# Patient Record
Sex: FEMALE | Race: WHITE | Employment: OTHER | URBAN - METROPOLITAN AREA
[De-identification: names, ages, dates, MRNs, and addresses within clinical notes are randomized per-mention and may not be internally consistent; named-entity substitution may affect disease eponyms.]

---

## 2023-06-07 ENCOUNTER — HOSPITAL ENCOUNTER (OUTPATIENT)
Age: 73
Setting detail: THERAPIES SERIES
End: 2023-06-07
Payer: MEDICARE

## 2023-06-07 PROCEDURE — 97140 MANUAL THERAPY 1/> REGIONS: CPT

## 2023-06-07 PROCEDURE — 97110 THERAPEUTIC EXERCISES: CPT

## 2023-06-19 ENCOUNTER — HOSPITAL ENCOUNTER (OUTPATIENT)
Age: 73
Setting detail: THERAPIES SERIES
Discharge: HOME | End: 2023-06-19
Payer: MEDICARE

## 2023-06-19 PROCEDURE — 97110 THERAPEUTIC EXERCISES: CPT

## 2023-06-19 PROCEDURE — 97140 MANUAL THERAPY 1/> REGIONS: CPT

## 2023-06-19 NOTE — FLOWSHEET NOTE
[] [facility]) - [facility] & Therapy  [address] [place], [address]    Physical Therapy Daily Treatment Note      Date:  2023  Patient Name:  Morena Brady    :  1950  MRN: [id number]  Physician:      Insurance: medicare, MMO suppliment  Diagnosis: s/p TSA   Onset Date: 3/13/23   Next Dr. Abdi Jackson: none scheduled  Visit# / total visits:  Medicare requires PN at visit 10  Cancels/No Shows: 0/0  Unable to verify amount of PT sessions previously- added KX modifier for month of  to ensure not missed. Subjective:  Patient reports  not feeling too bad today, trying to use UE more and more. Did a lot of housework over the weekend. Still not sleeping well at night.   Pain:  [x] Yes  [] No Location: right shoulder  Pain Rating: (0-10 scale) 3/10  Pain altered Tx:  [] No  [] Yes  Action:  Comments:    Objective:  Modalities: ice  Precautions:  Exercises:  Exercise Reps/ Time Weight/ Level Completed  Today Comments   pulleys x3'  x    codmans x10 2# x Made LB pain worsen- so completed fewer reps   Table/ door scrubs X20 each  x    Flexion stretch at SM 15\" x4  x    Finger ladder x10  x Able to reach higher today 6/12   Bicep curls x15 2# x    Shoulder add x15 yellow x    Rows/ ext/ER/IR x12 yellow x    Cane abd/ ER X15 each      Gentle corner stretch 15\" x3  x    Standing AROM abduction x5  x Added 6/   Tricep pull downs  x10 Blue t-band x Added /   Diagonal flex/ext  x10 each AROM x Added , fatigued quickly- watch form   Supine cane flex x15 2# x    Supine ABC's 1 round 1# x 6/15 added weight today          Wall push ups x10  x Added 6/          Right shoulder PROM/ mobs, scap mobs x10  x           Right shouder MHP       ice x10  x Requested ice rather than heat today                                             Other:    Specific Instructions for next treatment:continue working on progressing strength/ flexibility right UE      Assessment: [x]

## 2023-06-22 ENCOUNTER — HOSPITAL ENCOUNTER (OUTPATIENT)
Age: 73
Setting detail: THERAPIES SERIES
Discharge: HOME | End: 2023-06-22
Payer: MEDICARE

## 2023-06-22 PROCEDURE — 97140 MANUAL THERAPY 1/> REGIONS: CPT

## 2023-06-22 PROCEDURE — 97110 THERAPEUTIC EXERCISES: CPT

## 2023-06-22 NOTE — FLOWSHEET NOTE
Progressing toward goals. [] No change. [] Other:    [x] Patient would continue to benefit from skilled physical therapy services in order to: improve strength and ROM to allow full return to normal function. Patient continues to be doing more functionally at home with less intense pain. Reports she has been using right UE more with home chores/ cooking, and minimizing pain meds- although she isn't able to use % yet. Improved flexibility noted with ex's. STG/LTG  STG's: 1- decrease pain right UE to 0-2/10  2- improve ROM to WFL's  3- increase strength to 4+/5  4- improve SPADI score to 110/130    LTG's:  1- Patient to return to normal function including sleeping, reading, overhead and behind back reaching, dressing, bathing, lifting, and outdoor chores with minimal pain/ difficulty    Pt. Education:  [x] Yes  [] No  [x] Reviewed Prior HEP/Ed  Method of Education: [x] Verbal  [x] Demo  [] Written  Comprehension of Education:  [] Verbalizes understanding. [x] Demonstrates understanding. [x] Needs review. [] Demonstrates/verbalizes HEP/Ed previously given. Plan: [x] Continue per plan of care.    [] Other:      Treatment Charges: Mins Units   [x]  Modalities 10 0   [x]  Ther Exercise 35 2   [x]  Manual Therapy 10 1   []  Ther Activities     []  Aquatics     []  Neuromuscular     [] Vasocompression     [] Gait Training     [] Dry needling        [] 1 or 2 muscles        [] 3 or more muscles     []  Other     Total Treatment time 55 3     Time In:200            Time Out: 300    Electronically signed by:  Farhat Quinones PTA

## 2023-06-26 ENCOUNTER — HOSPITAL ENCOUNTER (OUTPATIENT)
Age: 73
Setting detail: THERAPIES SERIES
Discharge: HOME | End: 2023-06-26
Payer: MEDICARE

## 2023-06-29 ENCOUNTER — HOSPITAL ENCOUNTER (OUTPATIENT)
Age: 73
Setting detail: THERAPIES SERIES
Discharge: HOME | End: 2023-06-29
Payer: MEDICARE

## 2023-06-29 PROCEDURE — 97110 THERAPEUTIC EXERCISES: CPT

## 2023-06-29 PROCEDURE — 97140 MANUAL THERAPY 1/> REGIONS: CPT

## 2023-07-03 ENCOUNTER — APPOINTMENT (OUTPATIENT)
Age: 73
End: 2023-07-03
Payer: MEDICARE

## 2023-07-06 ENCOUNTER — HOSPITAL ENCOUNTER (OUTPATIENT)
Age: 73
Setting detail: THERAPIES SERIES
Discharge: HOME | End: 2023-07-06
Payer: MEDICARE

## 2023-07-06 PROCEDURE — 97140 MANUAL THERAPY 1/> REGIONS: CPT

## 2023-07-06 PROCEDURE — 97110 THERAPEUTIC EXERCISES: CPT

## 2023-07-06 NOTE — FLOWSHEET NOTE
[x] [facility]) - [facility] & Therapy  [address] [place], [address]     Physical Therapy Daily Treatment Note      Date:  2023  Patient Name:  Sofía Mckinley    :  1950  MRN: [id number]  Physician:      Insurance: medicare, MMO suppliment  Diagnosis: s/p TSA   Onset Date: 3/13/23   Next  [facility]: none scheduled  Visit# / total visits:  Medicare requires PN at visit 10  Cancels/No Shows: 2/0  Unable to verify amount of PT sessions previously- added KX modifier for month of  to ensure not missed. Subjective: Pt reports right shoulder is feeling better. She reports very little pain in shoulder. She notes her shoulder is more stiff than painful. She does report she had an MRI yesterday of her back and that revealed a compression fracture of L4.   Pain:  [x] Yes  [] No Location: right shoulder  Pain Rating: (0-10 scale) 1/10  Pain altered Tx:  [x] No  [] Yes  Action:  Comments:   Objective:  Modalities: ice  Precautions:  Exercises:  Exercise Reps/ Time Weight/ Level Completed  Today Comments   pulleys x3'  x    codmans x10 2# Hold today    door scrubs X20   x    Flexion stretch at SM 15\" x4  Hold today    Finger ladder x10  x Able to reach higher today 6/12   Bicep curls x15 2# Hold today    Shoulder add --- yellow Hold today    Rows/ ext/ER/IR -- yellow Hold today    Cane abd/ ER X15 each  -    Gentle corner stretch 15\" x3  x    Standing AROM flex and abduction X5-10  x Added 6/   Tricep pull downs  -- Blue t-band Hold today Added /   Diagonal flex/ext  x15 each AROM x Added , fatigued quickly- watch form   Supine cane flex x15 2# x    Supine ABC's 1 round 1# x 6/15 added weight today   Sidelying ER 20 x      Wall push ups x10  x Added 6/12          Right shoulder PROM/ mobs, scap mobs x10  x           Right shouder MHP       ice x10  x Requested ice rather than heat today                               ROM right shoulder: flex, abd, ER, IR

## 2024-01-25 ENCOUNTER — HOSPITAL ENCOUNTER (OUTPATIENT)
Age: 74
Setting detail: THERAPIES SERIES
Discharge: HOME | End: 2024-01-25
Payer: MEDICARE

## 2024-01-25 PROCEDURE — 97110 THERAPEUTIC EXERCISES: CPT

## 2024-01-25 PROCEDURE — 97161 PT EVAL LOW COMPLEX 20 MIN: CPT

## 2024-01-25 NOTE — CONSULTS
[] [facility]  Outpatient Rehabilitation &  Therapy  [address]  [phone]  F: [phone] [] [facility]  Outpatient Rehabilitation &  Therapy  [address]  P: [phone]  F: [phone] [] [facility]  Outpatient Rehabilitation &  Therapy  [address]  P: [phone]  F: [phone] [] [facility]  Outpatient Rehabilitation &  Therapy  [address]  P: [phone]  F: [phone] [] [facility]  Outpatient Rehabilitation &  Therapy  [address]   P: [phone]  F: [phone]  [] [facility]  Outpatient Rehabilitation &  Therapy  [address]   P: [phone]  F: [phone] [x] [facility]  Outpatient Rehabilitation &  Therapy  [address]  P: [phone]  F: [phone] [] [facility]  Outpatient Rehabilitation &  Therapy  [address]  P: [phone]  F: [phone] [] [facility]  Outpatient Rehabilitation &  Therapy  [address]  P: [phone]  F: [phone]  [] [facility] Outpatient Rehabilitation &  Therapy  [address]  P: [phone]  F: [phone]     Physical Therapy General Evaluation    Date:  2024  Patient: Katie Mathis  : 1950  MRN: [id number]  Physician: Rad Riojas      Insurance: Medicare; Medical [place];  BMN, No Hard Max; No AUTH needed  Medical Diagnosis: S76.311D (ICD-10-CM) - Strain of muscle, fascia and tendon of the posterior muscle group at thigh level, right thigh, subsequent encounter     Rehab Codes: R26.89, M79.651, M79.661, M62.551, R26.81  Onset Date: 22                                  Next 's appt: 3/8/22    Subjective: 22 at [facility], slipped on water on floor.  Fell onto right side with both legs going out to the

## 2024-01-31 ENCOUNTER — HOSPITAL ENCOUNTER (OUTPATIENT)
Age: 74
Setting detail: THERAPIES SERIES
Discharge: HOME | End: 2024-01-31
Payer: MEDICARE

## 2024-01-31 PROCEDURE — 97110 THERAPEUTIC EXERCISES: CPT

## 2024-01-31 NOTE — FLOWSHEET NOTE
[] [facility]  Outpatient Rehabilitation & Therapy  [address] [place], [place] [address]    Physical Therapy Daily Treatment Note      Date:  2024  Patient Name:  Katie Mathis    :  1950  MRN: [id number]  Physician:   Rad Riojas      Insurance:  Medicare; Medical [place];  BMN, No Hard Max; No AUTH needed   Diagnosis: S76.311D (ICD-10-CM) - Strain of muscle, fascia and tendon of the posterior muscle group at thigh level, right thigh, subsequent encounter                  Rehab Codes: R26.89, M79.651, M79.661, M62.551, R26.81   Onset Date:  22    Next  Appt:  3/8/22     Visit# / total visits:   Cancels/No Shows: 0/0    Subjective:    Pain:  [x] Yes  [] No Location: meghan LB  Pain Rating: (0-10 scale) 2/10  Pain altered Tx:  [x] No  [] Yes  Action:  Comments: Pain in right LE with walking / sitting. Finds she doesn't pick her feet up as well as she should. Right LE heaviness/ weakness, but currently pain free. States she was complaint with HEP since last session, but had difficulty with prone activities (in LB).     Objective:  walks into clinic without device  Modalities:   Precautions:  Right Ham partial tear;  Limited balance   Exercises: exercises completed today in bold  Exercise Reps/ Time Weight/ Level Comments   Prone 4'    prone on pillow   CARMEN 3'       4 way SLR Bilateral 10 each       Bilateral Piriformis stretch  15\" x4     24 new   LTR  x10     new   Bridge  x10     new   Pelvic tilts  x10     new   Ant hip stretch  1' meghan     new                                           STANDING         Calf stretch  15\" x4   meghan    new   Squats         Heel/toe raises         Ham stretch at steps?  15\" x4   meghan    new                                           BALANCE         Feet together         On Foam         Biased Stance         Eyes Closed         Single Leg Stance                                       Side Step

## 2024-02-07 ENCOUNTER — APPOINTMENT (OUTPATIENT)
Age: 74
End: 2024-02-07
Payer: MEDICARE

## 2024-02-14 ENCOUNTER — HOSPITAL ENCOUNTER (OUTPATIENT)
Age: 74
Setting detail: THERAPIES SERIES
Discharge: HOME | End: 2024-02-14

## 2024-02-14 NOTE — FLOWSHEET NOTE
[] [facility]  Outpatient Rehabilitation & Therapy  [address]   [place], [place] [address]       Physical Therapy Cancel/No Show note    Date: 2024  Patient: Katie Mathis  : 1950  MRN: [id number]    Visit Count:   Cancels/No Shows to date:     For today's appointment patient:    [x]  Cancelled    [] Rescheduled appointment    [] No-show     Reason given by patient:    [x]  Patient ill    []  Conflicting appointment    [] No transportation      [] Conflict with work    [] No reason given    [] Weather related    [] COVID-19    [] Other:      Comments:        [x] Next appointment was confirmed    Electronically signed by: Christy Bacon PTA

## 2024-02-21 ENCOUNTER — APPOINTMENT (OUTPATIENT)
Age: 74
End: 2024-02-21
Payer: MEDICARE

## 2024-02-26 ENCOUNTER — HOSPITAL ENCOUNTER (OUTPATIENT)
Age: 74
Setting detail: THERAPIES SERIES
Discharge: HOME | End: 2024-02-26
Payer: MEDICARE

## 2024-02-26 NOTE — FLOWSHEET NOTE
[] [facility]  Outpatient Rehabilitation & Therapy  [address]   [place], [place] [address]       Physical Therapy Cancel/No Show note    Date: 2024  Patient: Katie Mathis  : 1950  MRN: [id number]    Visit Count:   Cancels/No Shows to date:     For today's appointment patient:    [x]  Cancelled    [] Rescheduled appointment    [] No-show     Reason given by patient:    [x]  Patient ill    []  Conflicting appointment    [] No transportation      [] Conflict with work    [] No reason given    [] Weather related    [] COVID-19    [] Other:      Comments:        [x] Next appointment was confirmed    Electronically signed by: Christy Bacon PTA

## 2024-02-29 ENCOUNTER — HOSPITAL ENCOUNTER (OUTPATIENT)
Age: 74
Setting detail: THERAPIES SERIES
Discharge: HOME | End: 2024-02-29
Payer: MEDICARE

## 2024-02-29 PROCEDURE — 97110 THERAPEUTIC EXERCISES: CPT

## 2024-02-29 NOTE — FLOWSHEET NOTE
Piriformis Stretch with Foot on Ground  - 2 x daily - 7 x weekly - 1 sets - 4 reps - 15 hold  - Supine Lower Trunk Rotation  - 2 x daily - 7 x weekly - 1 sets - 10 reps  - Supine Bridge  - 2 x daily - 7 x weekly - 1 sets - 10 reps  - Supine Posterior Pelvic Tilt  - 2 x daily - 7 x weekly - 3 sets - 10 reps  - Modified Bryan Stretch  - 1 x daily - 7 x weekly - 1 sets - 10 reps  - Gastroc Stretch on Wall  - 2 x daily - 7 x weekly - 1 sets - 4 reps - 15 hold  - Mini Squat with Counter Support  - 2 x daily - 7 x weekly - 1 sets - 10 reps  - Heel Toe Raises with Counter Support  - 2 x daily - 7 x weekly - 1 sets - 10 reps  - Standing Hamstring Stretch with Step  - 2 x daily - 7 x weekly - 1 sets - 4 reps - 15 hold  - Tandem Stance  - 2 x daily - 7 x weekly - 1 sets - 4 reps - 15 hold  - Single Leg Stance  - 2 x daily - 7 x weekly - 1 sets - 4 reps - 15 hold    Access Code: [email or web address]  URL: [email or web address]  Date: 01/25/2024  Prepared by: Reynaldo Ambriz  Plan: [x] Continue per plan of care.   [] Other:      Treatment Charges: Mins Units   []  Modalities     [x]  Ther Exercise 43 3   []  Manual Therapy     []  Ther Activities     []  Aquatics     []  Neuromuscular     [] Vasocompression     [] Gait Training     [] Dry needling        [] 1 or 2 muscles        [] 3 or more muscles     []  Other     Total Treatment time 43 3     Time In: 1:30 pm            Time Out: 2:13 pm    Electronically signed by:  Christy Bacon PTA

## 2024-03-04 ENCOUNTER — HOSPITAL ENCOUNTER (OUTPATIENT)
Age: 74
Setting detail: THERAPIES SERIES
Discharge: HOME | End: 2024-03-04
Payer: MEDICARE

## 2024-03-04 PROCEDURE — 97110 THERAPEUTIC EXERCISES: CPT

## 2024-03-04 NOTE — FLOWSHEET NOTE
STG/LTG  STG: (to be met in 10 treatments)  ? Pain: Low back and right leg to 5/10 at worst to facilitate walking  ? ROM: Of trunk to at least 75% of normal to facilitate walking and climbing steps  ? Strength: Bilateral hips to 4/10 to facilitate walking and cooking  ? Function: Pt to walk to grocery shop without increased pain  Patient to be independent with home exercise program as demonstrated by performance with correct form without cues.  Demonstrate Knowledge of fall prevention 1/31/24 MET fall prevention handout given  LTG: (to be met in 24 treatments)  Improve Tinetti score to at least 24/28 to prevent falls  Improve TUG test time to 10 seconds to facilitate walking and house work  Improve LEFS  score to 25% impaired at worst to facilitate walking and nuha  Decrease low back and right leg pain to 3/10 at worst to facilitate walking and baking     Pt. Education:  [x] Yes  [] No  [x] Reviewed Prior HEP/Ed  Method of Education: [x] Verbal  [x] Demo  [] Written  Comprehension of Education:  [x] Verbalizes understanding.  [] Demonstrates understanding.  [x] Needs review.  [] Demonstrates/verbalizes HEP/Ed previously given.   3/4/24 instructed on newly added ex's- updated Med bridge to include fwd/ lateral step ex's  2/29/24: discussed plan to add further strength acts next session if she feels ok  1/31/24 reviewed current HEP and updated with newly added exercises.   Access Code: [email or web address]  URL: [email or web address]  Date: 03/04/2024  Prepared by: Christy Bacon    Exercises  - Lying Prone  - 2 x daily - 7 x weekly - 1 sets - 1 reps - 5 minutes hold  - Static Prone on Elbows  - 2 x daily - 7 x weekly - 1 sets - 1 reps - 3 minutes hold  - Prone Hip Extension  - 2 x daily - 7 x weekly - 1 sets - 10 reps  - Sidelying Hip Abduction  - 2 x daily - 7 x weekly - 1 sets - 10 reps  - Sidelying Hip Adduction  - 2 x daily - 7 x weekly - 1 sets - 10 reps  - Straight Leg Raise  - 2 x daily - 7 x weekly - 1 sets

## 2024-03-07 ENCOUNTER — HOSPITAL ENCOUNTER (OUTPATIENT)
Age: 74
Setting detail: THERAPIES SERIES
Discharge: HOME | End: 2024-03-07
Payer: MEDICARE

## 2024-03-07 PROCEDURE — 97110 THERAPEUTIC EXERCISES: CPT

## 2024-03-07 NOTE — FLOWSHEET NOTE
Feet together  15\" x 2    Added  3/7/24   On Foam         Biased Stance  15\" x 2    Added 3/7/24   Eyes Closed  15\" x 2    Added 3/7/24   Single Leg Stance                                       Side Step         Heel to toe walk         Retro walk                                                                               Ultrasound??                   Dry Needling??  discussed                                                                                                                                                                                                                                                                           Other: issued fall prevention handout 1/31/24- will use heat at home today.  Had previously discussed dry needling with patient (we do not do here, but can get her in contact with a therapist who does)- she is not interested at this time due to wanting to focus on strength and flexibility. Possibly add lateral step ups next session.    Specific Instructions for next treatment: monitor response to exercises and advance as appropriate. If patient feeling up to it, add further standing strength/ balance acts.      Assessment: [x] Progressing toward goals. Pt reports she is finally feeling better from being ill recently.  She notes she has been walking better with more speed over past several days.  She tolerated balance activities that were added today without increased pain.  She did demonstrate limited balance with PT today.  She should benefit from more PT to continue to work on LE strength and balance to improve her safe mobility.    [] No change .     [] Other:    [x] Patient would continue to benefit from skilled physical therapy services due to injury to right hamstring and recent Lumbar kyphoplasty.  She notes pain and soreness in low back, pelvis area, and right leg.  She also has limited flexibility and strength of her trunk and LE's.  Katie also demonstrated  limited

## 2024-03-11 ENCOUNTER — HOSPITAL ENCOUNTER (OUTPATIENT)
Age: 74
Setting detail: THERAPIES SERIES
Discharge: HOME | End: 2024-03-11
Payer: MEDICARE

## 2024-03-11 PROCEDURE — 97110 THERAPEUTIC EXERCISES: CPT

## 2024-03-11 PROCEDURE — 97112 NEUROMUSCULAR REEDUCATION: CPT

## 2024-03-11 NOTE — FLOWSHEET NOTE
[x] [facility]  Outpatient Rehabilitation & Therapy  [address] [place], [place] [address]    Physical Therapy Daily Treatment Note      Date:  3/11/2024  Patient Name:  Katie Mathis    :  1950  MRN: [id number]  Physician:   Rad Riojas      Insurance:  Medicare; Medical [place];  BMN, No Hard Max; No AUTH needed   Diagnosis: S76.311D (ICD-10-CM) - Strain of muscle, fascia and tendon of the posterior muscle group at thigh level, right thigh, subsequent encounter                  Rehab Codes: R26.89, M79.651, M79.661, M62.551, R26.81   Onset Date:  22    Next  Appt:  4/3/24 (ortho)     Visit# / total visits:   Cancels/No Shows: 2/0    Subjective:    Pain:  [x] Yes  [] No Location: jen LB / left shoulder Pain Rating: (0-10 scale) 2/10  Pain altered Tx:  [x] No  [] Yes  Action:  Comments: \"my body is extremely tired today\". States she thinks she may need to get blood work done, she has been very tired and thirsty  Objective:  walks into clinic without device, occasionally dragging feet- dillon when tired  Modalities:   Precautions:  Right Ham partial tear;  Limited balance   Exercises: exercises completed today in bold  Exercise Reps/ Time Weight/ Level Comments   Prone 4'    no pillow   CARMEN 3'       4 way SLR Bilateral x15 each       Bilateral Piriformis stretch  15\" x4     24 new   LTR  x10     new   Bridge  x10     new   Pelvic tilts  x10     new   Ant hip stretch  1' jen     new                                           STANDING         Calf stretch  15\" x4   jen    new   Squats  x10    added 3/4   Heel/toe raises  x20    added 3/4   Ham stretch at steps  15\" x4   jen    new    fwd step ups  6\" x10  jen  added 3/4    lateral step ups  6\" x 10  JEN Added  3/7                       BALANCE         Feet together  15\" x 2    Added  3/7/24   On Foam         Biased Stance  15\" x 2    Added 3/7/24   Eyes Closed  15\" x 2  on foam Minerva

## 2024-03-14 ENCOUNTER — HOSPITAL ENCOUNTER (OUTPATIENT)
Age: 74
Setting detail: THERAPIES SERIES
Discharge: HOME | End: 2024-03-14
Payer: MEDICARE

## 2024-03-14 NOTE — FLOWSHEET NOTE
[] [facility]  Outpatient Rehabilitation & Therapy  [address]   [place], [place] [address]       Physical Therapy Cancel/No Show note    Date: 3/14/2024  Patient: Katie Mathis  : 1950  MRN: [id number]    Visit Count:   Cancels/No Shows to date: 3/0    For today's appointment patient:    [x]  Cancelled    [] Rescheduled appointment    [] No-show     Reason given by patient:    [x]  Patient ill    []  Conflicting appointment    [] No transportation      [] Conflict with work    [] No reason given    [] Weather related    [] COVID-19    [] Other:      Comments:        [x] Next appointment was confirmed    Electronically signed by: Reynaldo Ambriz PT

## 2024-03-18 ENCOUNTER — HOSPITAL ENCOUNTER (OUTPATIENT)
Age: 74
Setting detail: THERAPIES SERIES
Discharge: HOME | End: 2024-03-18
Payer: MEDICARE

## 2024-03-18 PROCEDURE — 97110 THERAPEUTIC EXERCISES: CPT

## 2024-03-18 NOTE — FLOWSHEET NOTE
[x] [facility]  Outpatient Rehabilitation & Therapy  [address] [place], [place] [address]    Physical Therapy Daily Treatment Note      Date:  3/18/2024  Patient Name:  Katie Mathis    :  1950  MRN: [id number]  Physician:   Rad Riojas      Insurance:  Medicare; Medical [place];  BMN, No Hard Max; No AUTH needed   Diagnosis: S76.311D (ICD-10-CM) - Strain of muscle, fascia and tendon of the posterior muscle group at thigh level, right thigh, subsequent encounter                  Rehab Codes: R26.89, M79.651, M79.661, M62.551, R26.81   Onset Date:  22    Next  Appt:  4/3/24 (ortho)     Visit# / total visits:   Cancels/No Shows: 2/0    Subjective:    Pain:  [x] Yes  [] No Location: meghan LB / left shoulder Pain Rating: (0-10 scale) 2/10  Pain altered Tx:  [x] No  [] Yes  Action:  Comments: Patient notes she went to the ER since last session and was found to have a BS >600. Has been more controlled past 2 days (took just prior to PT today and it was 168) although she is still feeling very shaky and fatigued. Continues report of feeling like she staggers a lot, and not picking up feet when walking. She also states she doesn't feel she is mentally on task lately as well- notes some confusion.   She is to call family  At end of the week with full report of documented Blood sugars.     Objective:  walks into clinic with cane and Minerva from spouse, occasionally dragging feet- dillon when tired  Modalities:   Precautions:  Right Ham partial tear;  Limited balance   Exercises: exercises completed today in bold  Exercise Reps/ Time Weight/ Level Comments   Prone 4'    no pillow   CARMEN 3'       4 way SLR Bilateral x10 each       Bilateral Piriformis stretch  15\" x4     24 new   LTR  x10     new   Bridge  x10     new   Pelvic tilts  x10     new   Ant hip stretch  1' meghan     new    res hip abd  x20  blue  added 3/18    hip add (ball squeeze)  5\" x20

## 2024-03-21 ENCOUNTER — HOSPITAL ENCOUNTER (OUTPATIENT)
Age: 74
Setting detail: THERAPIES SERIES
Discharge: HOME | End: 2024-03-21
Payer: MEDICARE

## 2024-03-21 PROCEDURE — 97110 THERAPEUTIC EXERCISES: CPT

## 2024-03-21 NOTE — FLOWSHEET NOTE
[x] [facility]  Outpatient Rehabilitation & Therapy  [address] [place], [place] [address]    Physical Therapy Daily Treatment Note      Date:  3/21/2024  Patient Name:  Katie Mathis    :  1950  MRN: [id number]  Physician:   Rad Riojas      Insurance:  Medicare; Medical [place];  BMN, No Hard Max; No AUTH needed   Diagnosis: S76.311D (ICD-10-CM) - Strain of muscle, fascia and tendon of the posterior muscle group at thigh level, right thigh, subsequent encounter                  Rehab Codes: R26.89, M79.651, M79.661, M62.551, R26.81   Onset Date:  22    Next  Appt:  4/3/24 (ortho)     Visit# / total visits:   Cancels/No Shows: 2/0    Subjective:    Pain:  [x] Yes  [] No Location: meghan LB / left shoulder Pain Rating: (0-10 scale) 2/10  Pain altered Tx:  [x] No  [] Yes  Action:  Comments: Patient reports she still has pain in right posterior thigh.  She reports she still has pain with sitting and at end of day.  She reports she is still having some issues with blood sugar but this is also improved.  She reports her head is not back to normal but is improving    Objective:  walks into clinic with cane and Minerva from spouse, occasionally dragging feet- dillon when tired  Modalities:   Precautions:  Right Ham partial tear;  Limited balance   Exercises: exercises completed today in bold  Exercise Reps/ Time Weight/ Level Comments   Prone 4'    no pillow   CARMEN 3'       4 way SLR Bilateral X10-15 each       Bilateral Piriformis stretch  15\" x4     24 new   LTR  x10     new   Bridge  x10     new   Pelvic tilts  x10     new   Ant hip stretch  1' meghan     new    res hip abd  x20  blue  added 3/18    hip add (ball squeeze)  5\" x20    added 3/18    supine hamstring stretch  15 x4  with belt  added 3/18             STANDING         Calf stretch  15\" x4   meghan      Squats  x10     added 3/4   Heel/toe raises  x20     added 3/4   Ham stretch at steps  15\" x4

## 2024-03-25 ENCOUNTER — HOSPITAL ENCOUNTER (OUTPATIENT)
Age: 74
Setting detail: THERAPIES SERIES
Discharge: HOME | End: 2024-03-25
Payer: MEDICARE

## 2024-03-25 PROCEDURE — 97112 NEUROMUSCULAR REEDUCATION: CPT

## 2024-03-25 PROCEDURE — 97110 THERAPEUTIC EXERCISES: CPT

## 2024-03-25 NOTE — FLOWSHEET NOTE
Demonstrates/verbalizes HEP/Ed previously given.   3/11/24 instructed on newly added ex's-  2/29/24: discussed plan to add further strength acts next session if she feels ok  1/31/24 reviewed current HEP and updated with newly added exercises.   Access Code: [email or web address]  URL: [email or web address]  Date: 03/04/2024  Prepared by: Christy Bacon    Exercises  - Lying Prone  - 2 x daily - 7 x weekly - 1 sets - 1 reps - 5 minutes hold  - Static Prone on Elbows  - 2 x daily - 7 x weekly - 1 sets - 1 reps - 3 minutes hold  - Prone Hip Extension  - 2 x daily - 7 x weekly - 1 sets - 10 reps  - Sidelying Hip Abduction  - 2 x daily - 7 x weekly - 1 sets - 10 reps  - Sidelying Hip Adduction  - 2 x daily - 7 x weekly - 1 sets - 10 reps  - Straight Leg Raise  - 2 x daily - 7 x weekly - 1 sets - 10 reps  - Supine Piriformis Stretch with Foot on Ground  - 2 x daily - 7 x weekly - 1 sets - 4 reps - 15 hold  - Supine Lower Trunk Rotation  - 2 x daily - 7 x weekly - 1 sets - 10 reps  - Supine Bridge  - 2 x daily - 7 x weekly - 1 sets - 10 reps  - Supine Posterior Pelvic Tilt  - 2 x daily - 7 x weekly - 3 sets - 10 reps  - Modified Bryan Stretch  - 1 x daily - 7 x weekly - 1 sets - 10 reps  - Gastroc Stretch on Wall  - 2 x daily - 7 x weekly - 1 sets - 4 reps - 15 hold  - Mini Squat with Counter Support  - 2 x daily - 7 x weekly - 1 sets - 10 reps  - Heel Toe Raises with Counter Support  - 2 x daily - 7 x weekly - 1 sets - 10 reps  - Standing Hamstring Stretch with Step  - 2 x daily - 7 x weekly - 1 sets - 4 reps - 15 hold  - Tandem Stance  - 2 x daily - 7 x weekly - 1 sets - 4 reps - 15 hold  - Single Leg Stance  - 2 x daily - 7 x weekly - 1 sets - 4 reps - 15 hold  - Forward Step Up with Counter Support  - 1 x daily - 7 x weekly - 1 sets - 10-15 reps  - Lateral Step Up with Counter Support  - 1 x daily - 7 x weekly - 1 sets - 10-15 reps    Plan: [x] Continue per plan of care.   [] Other:      Treatment Charges: Mins Units   []

## 2024-03-28 ENCOUNTER — HOSPITAL ENCOUNTER (OUTPATIENT)
Age: 74
Setting detail: THERAPIES SERIES
Discharge: HOME | End: 2024-03-28
Payer: MEDICARE

## 2024-03-28 PROCEDURE — 97110 THERAPEUTIC EXERCISES: CPT

## 2024-03-28 PROCEDURE — 97112 NEUROMUSCULAR REEDUCATION: CPT

## 2024-03-28 NOTE — FLOWSHEET NOTE
[x] [facility]  Outpatient Rehabilitation & Therapy  [address], [place] [address]    Physical Therapy Daily Treatment Note/Progress Note      Date:  3/28/2024  Patient Name:  Katie Mathis    :  1950  MRN: [id number]  Physician:   Rad Riojas      Insurance:  Medicare; [employer];  BMN, No Hard Max; No AUTH needed   Diagnosis: S76.311D (ICD-10-CM) - Strain of muscle, fascia and tendon of the posterior muscle group at thigh level, right thigh, subsequent encounter                  Rehab Codes: R26.89, M79.651, M79.661, M62.551, R26.81   Onset Date:  22    Next  Appt:  4/3/24 (ortho)     Visit# / total visits: 10/24  Cancels/No Shows: 2/0  Progress Note due by 20th visit per Medicare Guidelines  Subjective:    Pain:  [x] Yes  [] No Location: meghan LB / left shoulder Pain Rating: (0-10 scale) 2/10 at present;  4/10 at worst  Pain altered Tx:  [x] No  [] Yes  Action:  Comments: Pt denies new problems.  She reports she is walking better and feels more safe and stable while she is walking.  She reports pain increases sitting or walking too long.  She reports sitting is usually the worst.    Objective:  walks into clinic with cane, slow moving-but no obvious unsteadiness. She does seem a little more off balance when walking clinic without cane.   Modalities:   Precautions:  Right Ham partial tear;  Limited balance   Exercises: exercises completed today in bold  Exercise Reps/ Time Weight/ Level Comments   Prone 4'    no pillow   CARMEN 3'       4 way SLR Bilateral X10-15 each       Bilateral Piriformis stretch  15\" x4     24 new   LTR  x10     new   Bridge  x10     new   Pelvic tilts  x10     new   Ant hip stretch  1' meghan     new    res hip abd  x20  blue  added 3/18    hip add (ball squeeze)  5\" x20    added 3/18    supine hamstring stretch  15 x4  with belt  added 3/18             STANDING         Calf stretch  15\" x4   meghan      Squats

## 2024-04-02 ENCOUNTER — HOSPITAL ENCOUNTER (OUTPATIENT)
Age: 74
Setting detail: THERAPIES SERIES
Discharge: HOME | End: 2024-04-02
Payer: MEDICARE

## 2024-04-02 PROCEDURE — 97112 NEUROMUSCULAR REEDUCATION: CPT

## 2024-04-02 PROCEDURE — 97110 THERAPEUTIC EXERCISES: CPT

## 2024-04-02 NOTE — FLOWSHEET NOTE
[x]  Neuromuscular 10 1   [] Vasocompression     [] Gait Training     [] Dry needling        [] 1 or 2 muscles        [] 3 or more muscles     []  Other     Total Treatment time 40 3     Time In:    2:55 pm      Time Out: 3:47 pm    Electronically signed by:  Reynaldo Ambriz PT

## 2024-04-04 ENCOUNTER — HOSPITAL ENCOUNTER (OUTPATIENT)
Age: 74
Setting detail: THERAPIES SERIES
Discharge: HOME | End: 2024-04-04
Payer: MEDICARE

## 2024-04-04 PROCEDURE — 97110 THERAPEUTIC EXERCISES: CPT

## 2024-04-04 PROCEDURE — 97112 NEUROMUSCULAR REEDUCATION: CPT

## 2024-04-04 NOTE — FLOWSHEET NOTE
BALANCE         Feet together  15\" x 2     Added  3/7/24   On Foam         Biased Stance  15\" x 2     Added 3/7/24   Eyes Closed  15\" x 2   on foam Minerva  Added 3/7/24   Single Leg Stance  15\" x2   Minerva  added 3/11/24                                 Side Step  10' 6   At half wall   Heel to toe walk        Retro walk                                                                              Ultrasound??                   Dry Needling??  discussed                                                                                                                                                                                                                                                                           Other:     Specific Instructions for next treatment: Plan D/C at this time.     Assessment: [] Progressing toward goals.     [] No change     [x] Other: Patient has progressed well. She met with Dr. Garcia and they agreed patient is at a point where she can continue HEP on her own in order to continue strength gains without over doing it.    [] Patient would continue to benefit from skilled physical therapy services due to injury to right hamstring and recent Lumbar kyphoplasty.  She notes pain and soreness in low back, pelvis area, and right leg.  She also has limited flexibility and strength of her trunk and LE's.  Katie also demonstrated  limited balance and mobility.  All of these issues are causing her problems and should benefit from skilled PT to help make her safer at home and in the community       STG/LTG  STG: (to be met in 10 treatments)  ? Pain: Low back and right leg to 5/10 at worst to facilitate walking  3/28/24 Goal Met;  Pain 4/10 at worst  ? ROM: Of trunk to at least 75% of normal to facilitate walking and climbing steps 3/28/24  Not met but improved to 75% except extension 60% of normal  ? Strength: Bilateral hips to 4/5 to facilitate walking and cooking  3/28/24 Not Met but improved to:SLR

## 2024-04-09 ENCOUNTER — APPOINTMENT (OUTPATIENT)
Age: 74
End: 2024-04-09
Payer: MEDICARE

## 2024-04-11 ENCOUNTER — APPOINTMENT (OUTPATIENT)
Age: 74
End: 2024-04-11
Payer: MEDICARE